# Patient Record
Sex: FEMALE | Race: WHITE | Employment: FULL TIME | URBAN - NONMETROPOLITAN AREA
[De-identification: names, ages, dates, MRNs, and addresses within clinical notes are randomized per-mention and may not be internally consistent; named-entity substitution may affect disease eponyms.]

---

## 2019-12-06 ENCOUNTER — HOSPITAL ENCOUNTER (OUTPATIENT)
Dept: MRI IMAGING | Age: 39
Discharge: HOME | End: 2019-12-06
Payer: COMMERCIAL

## 2019-12-06 DIAGNOSIS — D05.02 LOBULAR CARCINOMA IN SITU (LCIS) OF LEFT BREAST: ICD-10-CM

## 2019-12-20 ENCOUNTER — HOSPITAL ENCOUNTER (OUTPATIENT)
Dept: MRI IMAGING | Age: 39
Discharge: HOME | End: 2019-12-20
Payer: COMMERCIAL

## 2019-12-20 DIAGNOSIS — D05.02 LOBULAR CARCINOMA IN SITU (LCIS) OF LEFT BREAST: ICD-10-CM

## 2019-12-20 PROCEDURE — 77049 MRI BREAST C-+ W/CAD BI: CPT

## 2019-12-20 PROCEDURE — A9577 INJ MULTIHANCE: HCPCS | Performed by: SURGERY

## 2019-12-20 PROCEDURE — 6360000004 HC RX CONTRAST MEDICATION: Performed by: SURGERY

## 2019-12-20 RX ADMIN — GADOBENATE DIMEGLUMINE 19 ML: 529 INJECTION, SOLUTION INTRAVENOUS at 16:01

## 2020-01-22 ENCOUNTER — TELEPHONE (OUTPATIENT)
Dept: SURGERY | Age: 40
End: 2020-01-22

## 2020-01-22 NOTE — TELEPHONE ENCOUNTER
Patient called khoa and asked for a return call. Please call pt back as soon as you can.   Thank you

## 2020-01-31 NOTE — PROGRESS NOTES
HISTORY OF PRESENT ILLNESS:    Ms. Raul Mayorga is a 44 y.o. white  female who presents with abnormal pathology following bilateral breast reduction. Her surgery was done in [facility] and on pathologic evaluation, she was found to have atypical lobular hyperplasia and lobular carcinoma in situ on the left. She has a family history of breast cancer in her paternal aunt    She has had myRisk testing and is negative    She is premenopausal.      MRI-12/20/2019  FINDINGS:   There is moderate background parenchymal enhancement, which appears   symmetric. There are scattered areas of fibroglandular density. Left breast:   There is extensive fat necrosis throughout the left breast. There is   associated rim enhancement. This somewhat limits evaluation for   underlying areas of abnormal enhancement. No additional areas of   abnormal enhancement are identified. Right breast:   Areas of fat necrosis are noted throughout the right breast with   associated enhancement peripherally. No additional areas of abnormal   enhancement are identified. Lymph nodes:   No suspicious axillary or internal mammary lymphadenopathy is   identified.       Impression   1. Extensive areas of fat necrosis throughout the breasts bilaterally   with associated rim enhancement. This is compatible with recent breast   reduction surgery. 2. No additional areas of enhancement are identified within either   breast, though evaluation is limited by the degree of enhancement   related to fat necrosis. 3. No suspicious lymphadenopathy. 4. BI-RADS Category 2-benign. Signed by Dr Rolanda Lafleur on 12/23/2019 7:54 AM         BREAST EXAM:    Florence Traore  has unremarkable fibrocystic changes throughout both breasts. There are no dominant masses, no skin or nipple changes and no axillary adenopathy. I see nothing suspicious on physical examination. She has bilateral reduction mammoplasty scars and appropriate nodularity associated with this.   She is healed sufficiently to consider proceeding with bilateral mastectomies and reconstruction. She has a significantly widened scars. IMPRESSION: High risk with LCIS. PLAN: Prophylactic bilateral mastectomy with bilateral reconstruction     I will discuss this with Dr. Enzo Rodriguez and we will determine the optimal timing of her management. DISCUSSION:  We discussed  the fibrocystic disease and its relationship to breast cancer  , the pathophysiology of breast cancer, the pathophysiology of fibrocystic disease, the importance of routine mammograms, the technique of good breast self-examination and encouraged her, the effect of caffeine on her breasts and the risks and benefits of hormone replacement therapy    I have seen, examined and reviewed this patient medication list, appropriate labs and imaging studies. I reviewed relevant medical records and others physicians notes. I discussed the plans of care with the patient. I answered all the questions to the patients satisfaction. I, Dr Emily Fernandez, personally performed the services described in this documentation as scribed by Dominique Schulz MA in my presence and is both accurate and complete. (Please note that portions of this note were completed with a voice recognition program. Efforts were made to edit the dictations but occasionally words are mis-transcribed.)  Over 50% of the total visit time of 60 minutes in face to face encounter with the patient, out of which more than 50% of the time was spent in counseling patient or family and coordination of care. Counseling included but was not limited to time spent reviewing labs, imaging studies/ treatment plan and answering questions.

## 2020-02-03 ENCOUNTER — OFFICE VISIT (OUTPATIENT)
Dept: SURGERY | Age: 40
End: 2020-02-03
Payer: COMMERCIAL

## 2020-02-03 VITALS
BODY MASS INDEX: 41.46 KG/M2 | WEIGHT: 234 LBS | HEART RATE: 80 BPM | DIASTOLIC BLOOD PRESSURE: 80 MMHG | HEIGHT: 63 IN | SYSTOLIC BLOOD PRESSURE: 110 MMHG

## 2020-02-03 PROCEDURE — 99205 OFFICE O/P NEW HI 60 MIN: CPT | Performed by: SURGERY

## 2020-02-03 RX ORDER — RANITIDINE 150 MG/1
TABLET ORAL
COMMUNITY

## 2020-02-03 RX ORDER — KETOROLAC TROMETHAMINE 15.75 MG/1
SPRAY, METERED NASAL
COMMUNITY

## 2020-02-03 RX ORDER — DESVENLAFAXINE 50 MG/1
TABLET, EXTENDED RELEASE ORAL
COMMUNITY

## 2020-02-03 RX ORDER — IBUPROFEN 800 MG/1
TABLET ORAL
COMMUNITY

## 2020-02-03 NOTE — Clinical Note
Make sure I  talk [facility]

## 2020-02-04 PROBLEM — N60.99 ATYPICAL LOBULAR HYPERPLASIA OF BREAST: Status: ACTIVE | Noted: 2020-02-04

## 2020-02-04 PROBLEM — D05.00 BREAST NEOPLASM, TIS (LCIS): Status: ACTIVE | Noted: 2020-02-04

## 2020-02-20 ENCOUNTER — TELEPHONE (OUTPATIENT)
Dept: SURGERY | Age: 40
End: 2020-02-20

## 2020-02-26 NOTE — TELEPHONE ENCOUNTER
I spoke with patient to let her know I would get with Dr. Beltran Matters to ask her question and get back with her

## 2020-03-06 ENCOUNTER — TELEPHONE (OUTPATIENT)
Dept: SURGERY | Age: 40
End: 2020-03-06

## 2020-10-15 ENCOUNTER — TELEPHONE (OUTPATIENT)
Dept: SURGERY | Age: 40
End: 2020-10-15

## 2020-10-15 NOTE — TELEPHONE ENCOUNTER
Called and spoke with patient. Dr. Ernesto Zhu wanted me to touch base with her. She was seen in February of 2020 and discussed surgical plans. COVID happened and we had not heard back from her. She states that her partner is currently deployed overseas for 4 months and she is going to wait until after the first of the year to come back in and hopefully get surgery set up in the spring. I told her that would be fine and I would let Dr. Ernesto Zhu know.

## 2020-12-22 ENCOUNTER — TRANSCRIBE ORDERS (OUTPATIENT)
Dept: ADMINISTRATIVE | Facility: HOSPITAL | Age: 40
End: 2020-12-22

## 2020-12-22 DIAGNOSIS — D05.02 LOBULAR CARCINOMA IN SITU OF LEFT BREAST: Primary | ICD-10-CM

## 2021-01-07 ENCOUNTER — APPOINTMENT (OUTPATIENT)
Dept: MRI IMAGING | Facility: HOSPITAL | Age: 41
End: 2021-01-07

## 2021-01-08 ENCOUNTER — APPOINTMENT (OUTPATIENT)
Dept: MRI IMAGING | Facility: HOSPITAL | Age: 41
End: 2021-01-08

## 2021-01-29 ENCOUNTER — HOSPITAL ENCOUNTER (OUTPATIENT)
Dept: MRI IMAGING | Age: 41
Discharge: HOME | End: 2021-01-29
Payer: COMMERCIAL

## 2021-01-29 DIAGNOSIS — D05.02 LOBULAR CARCINOMA IN SITU OF LEFT BREAST: ICD-10-CM

## 2021-01-29 PROCEDURE — 6360000004 HC RX CONTRAST MEDICATION: Performed by: PSYCHIATRY & NEUROLOGY

## 2021-01-29 PROCEDURE — 77049 MRI BREAST C-+ W/CAD BI: CPT

## 2021-01-29 PROCEDURE — A9577 INJ MULTIHANCE: HCPCS | Performed by: PSYCHIATRY & NEUROLOGY

## 2021-01-29 RX ADMIN — GADOBENATE DIMEGLUMINE 20 ML: 529 INJECTION, SOLUTION INTRAVENOUS at 15:15

## 2022-12-19 ENCOUNTER — OFFICE VISIT (OUTPATIENT)
Dept: SURGERY | Facility: CLINIC | Age: 42
End: 2022-12-19

## 2022-12-19 VITALS
BODY MASS INDEX: 31.01 KG/M2 | HEIGHT: 63 IN | HEART RATE: 86 BPM | OXYGEN SATURATION: 95 % | DIASTOLIC BLOOD PRESSURE: 95 MMHG | SYSTOLIC BLOOD PRESSURE: 135 MMHG | WEIGHT: 175 LBS

## 2022-12-19 DIAGNOSIS — E66.1 CLASS 1 DRUG-INDUCED OBESITY WITHOUT SERIOUS COMORBIDITY WITH BODY MASS INDEX (BMI) OF 31.0 TO 31.9 IN ADULT: ICD-10-CM

## 2022-12-19 DIAGNOSIS — Z86.000 HISTORY OF LOBULAR CARCINOMA IN SITU (LCIS) OF BREAST: ICD-10-CM

## 2022-12-19 DIAGNOSIS — Z98.890 S/P REDUCTION MAMMOPLASTY: ICD-10-CM

## 2022-12-19 DIAGNOSIS — Z80.3 FAMILY HISTORY OF BREAST CANCER: Primary | ICD-10-CM

## 2022-12-19 PROCEDURE — 99204 OFFICE O/P NEW MOD 45 MIN: CPT | Performed by: STUDENT IN AN ORGANIZED HEALTH CARE EDUCATION/TRAINING PROGRAM

## 2022-12-19 RX ORDER — MELOXICAM 15 MG/1
TABLET ORAL
COMMUNITY
Start: 2022-11-01

## 2022-12-19 RX ORDER — DIAZEPAM 2 MG/1
2 TABLET ORAL ONCE
Qty: 1 TABLET | Refills: 0 | Status: SHIPPED | OUTPATIENT
Start: 2022-12-19 | End: 2022-12-19

## 2022-12-19 NOTE — PATIENT INSTRUCTIONS

## 2022-12-19 NOTE — PROGRESS NOTES
"Office New Patient History and Physical:     Referring Provider: Sheri Smith AP*    Chief Complaint   Patient presents with   • Skin Lesion        Subjective .     History of present illness:  Rachel Manley is a 42 y.o. female who presents with an elevated risk of breast cancer based on her Tyrer-Cuzick and family history. S/p bilateral reduction with Atypical hyperplasia on pathology.     Breast History information:   Prior abnormal mammograms: 2021   Prior breast biopsies: 2021, left, fat necrosis   Palpable breast masses: cysts at 3:00 and 6:00 left breast, unchanged x years   Nipple discharge: none   Age at first menses: 12  Age at menopause: n/a   Number of biological children: 2  Age at first birth: 30   Years on birth control: 10 years on pills, IUD x 10 years   Years on HRT: none   Family history of breast cancer: Paternal aunt with two different primary breast cancers (age 50, 58)   Smoking History: none   Alcohol use: none   BMI: 31     Patient reports she has a bilateral MRI in [place] in  - no abnormalities. She is not on blood thinners.     History  History reviewed. No pertinent past medical history.,   Past Surgical History:   Procedure Laterality Date   • APPENDECTOMY     • BILATERAL BREAST REDUCTION     •  SECTION     •  SECTION     , History reviewed. No pertinent family history.,   Social History     Tobacco Use   • Smoking status: Never   • Smokeless tobacco: Never   Vaping Use   • Vaping Use: Never used   Substance Use Topics   • Alcohol use: Defer   • Drug use: Defer   , (Not in a hospital admission)   and Allergies:  Sulfa antibiotics    Current Outpatient Medications:   •  meloxicam (MOBIC) 15 MG tablet, , Disp: , Rfl:     Objective     Vital Signs   /95 (BP Location: Left arm, Patient Position: Sitting, Cuff Size: Adult)   Pulse 86   Ht 160 cm (63\")   Wt 79.4 kg (175 lb)   SpO2 95%   BMI 31.00 kg/m²      Physical " Exam:  General appearance - alert, well appearing, and in no distress  Mental status - alert, oriented to person, place, and time  Eyes - pupils equal and reactive, extraocular eye movements intact  Chest - no tachypnea, retractions or cyanosis  Heart - normal rate and regular rhythm  Neurological - alert, oriented, normal speech, no focal findings or movement disorder noted  Breast Exam: Bilateral breasts with reduction scars. Right reduction scars well healed.  Left breast with multiple cysts along reduction incisions and in left outer lower quadrant however incisions are healed. Bilateral nipples everted. Patient examined in the supine position with the ipsilateral arm above the head. No palpable masses bilaterally. No nipple discharge bilaterally. No palpable axillary nor supraclavicular adenopathy bilaterally.     Results Review:    The following data was reviewed by: Delma Pisano MD on 12/19/2022:  REFERRAL/PRE-AUTH MRN - SCAN - REF FROM [facility] (11/01/2022)  Genetic Testing: [place] Panel with 2 variants of uncertain significance: CHEK2 and APC.   Tyrer-Cuzick if using diagnosis of LCIS: 59.3%   Tyrer-Cuzick if using diagnosis of atypical hyperplasia: 36%     GENERAL SURGERY - SCAN - PATH, IMAGING (12/19/2022)    7/12/22 bilateral mammogram: there are bilateral changes of fat necrosis. An irregular area in the LEFT upper inner quadrant appears to have decrease din size when compared with the prior exam of 7/22/21 and exhibits an increased number of benign-appearing calcifications consistent with fat necrosis.     1/31/21: Bilateral breast MRI. Within the left breast there are 3 regions of fat necrosis with prominent rim enhancement. No abnormal enhancement within the contralateral right breast. No suspicious lymphadenopathy. BIRADS 2 benign.     Pathology [place] 8/25/19:   (1) Right Breast Reduction: benign mammary parenchyma with papillary apocrine change, negative for atypcial hyperplasia and  malignancy.   (2) Left breast reduction: multifocal atypical lobular hyperplasia with ductal involvement, focally reaching criteria for classic lobular carcinoma in situ.     Assessment & Plan       Diagnoses and all orders for this visit:    1. Family history of breast cancer (Primary)  -     MRI Breast Bilateral With & Without Contrast; Future  -     Ambulatory Referral to Plastic Surgery  -     diazePAM (Valium) 2 MG tablet; Take 1 tablet by mouth 1 (One) Time for 1 dose. Take before the MRI for anxiety  Dispense: 1 tablet; Refill: 0    2. History of lobular carcinoma in situ (LCIS) of breast  -     MRI Breast Bilateral With & Without Contrast; Future  -     Ambulatory Referral to Plastic Surgery  -     diazePAM (Valium) 2 MG tablet; Take 1 tablet by mouth 1 (One) Time for 1 dose. Take before the MRI for anxiety  Dispense: 1 tablet; Refill: 0    3. S/P reduction mammoplasty  -     MRI Breast Bilateral With & Without Contrast; Future  -     Ambulatory Referral to Plastic Surgery    4. Class 1 drug-induced obesity without serious comorbidity with body mass index (BMI) of 31.0 to 31.9 in adult         Rachel Manley is a 42 y.o. female with a family history of breast cancer as well as a personal history of ADH vs. LCIS (some disagreement over pathology on the [place] report). I have personally reviewed her pathology report, her imaging and her notes above. Her TC score is significantly elevated at 36% if ADH and 59% if LCIS is considered the diagnosis. She has significant cysts along her left reduction incisions which she reports are chronic. She has been doing high risk screening with MRI and mammogram yearly for imaging every 6 months. She has been considering bilateral prophylactic mastectomies with reconstruction. She has significant anxiety about her elevated breast cancer risk and her family history. She would like to speak to a plastic surgery to discuss her options of nipple sparing vs. Skin sparing and  implant options. I have referred her to Dr. Gonzalez. She is due for her screening MRI in Januarys he will follow up with me in January after her appointment with Dr. Gonzalez and her MRI to discuss proceeding with bilateral prophylactic mastectomies.    I personally called and spoke with Dr. Gonzalez about the patient's history and wishes and he agrees she would be a good candidate. He will see her in office. I have reviewed the records above.     She is on Tamoxifen for risk reduction and she should continue this.     BMI is >= 30 and <35. (Class 1 Obesity). The following options were offered after discussion;: weight loss educational material (shared in after visit summary)      Delma Pisano MD  12/21/22  11:27 CST

## 2023-01-06 ENCOUNTER — HOSPITAL ENCOUNTER (OUTPATIENT)
Dept: MRI IMAGING | Facility: HOSPITAL | Age: 43
Discharge: HOME | End: 2023-01-06
Admitting: STUDENT IN AN ORGANIZED HEALTH CARE EDUCATION/TRAINING PROGRAM
Payer: COMMERCIAL

## 2023-01-06 DIAGNOSIS — Z98.890 S/P REDUCTION MAMMOPLASTY: ICD-10-CM

## 2023-01-06 DIAGNOSIS — Z80.3 FAMILY HISTORY OF BREAST CANCER: ICD-10-CM

## 2023-01-06 DIAGNOSIS — Z86.000 HISTORY OF LOBULAR CARCINOMA IN SITU (LCIS) OF BREAST: ICD-10-CM

## 2023-01-06 PROCEDURE — A9577 INJ MULTIHANCE: HCPCS | Performed by: STUDENT IN AN ORGANIZED HEALTH CARE EDUCATION/TRAINING PROGRAM

## 2023-01-06 PROCEDURE — 77049 MRI BREAST C-+ W/CAD BI: CPT

## 2023-01-06 PROCEDURE — 0 GADOBENATE DIMEGLUMINE 529 MG/ML SOLUTION: Performed by: STUDENT IN AN ORGANIZED HEALTH CARE EDUCATION/TRAINING PROGRAM

## 2023-01-06 RX ADMIN — GADOBENATE DIMEGLUMINE 15 ML: 529 INJECTION, SOLUTION INTRAVENOUS at 16:24

## 2023-01-24 NOTE — PROGRESS NOTES
"Office Established Patient Note:     Referring Provider: No ref. provider found    Chief Complaint   Patient presents with   • Follow-up     Mrs. Manley is here for a f/u for hx of family breast cancer.        Subjective .     History of present illness:  Rachel Manley is a 42 y.o. female here to follow up MRI results and discuss prophylactic bilateral mastectomies. She tolerated her MRI well. She denies any change to her medical or surgical history since her last appointment. She denies any changes to her breasts since her last appointment.     History  History reviewed. No pertinent past medical history.,   Past Surgical History:   Procedure Laterality Date   • APPENDECTOMY     • BILATERAL BREAST REDUCTION     •  SECTION     •  SECTION     , History reviewed. No pertinent family history.,   Social History     Tobacco Use   • Smoking status: Never   • Smokeless tobacco: Never   Vaping Use   • Vaping Use: Never used   Substance Use Topics   • Alcohol use: Defer   • Drug use: Defer   , (Not in a hospital admission)   and Allergies:  Sulfa antibiotics    Current Outpatient Medications:   •  cetirizine (zyrTEC) 10 MG tablet, , Disp: , Rfl:   •  cimetidine (TAGAMET) 400 MG tablet, , Disp: , Rfl:   •  meloxicam (MOBIC) 15 MG tablet, , Disp: , Rfl:   •  Mounjaro 5 MG/0.5ML solution pen-injector, , Disp: , Rfl:   •  tamoxifen (NOLVADEX) 20 MG chemo tablet, , Disp: , Rfl:     Objective     Vital Signs   /82 (BP Location: Left arm, Patient Position: Sitting, Cuff Size: Adult)   Pulse 90   Ht 160 cm (62.99\")   Wt 77.1 kg (170 lb)   SpO2 99%   BMI 30.12 kg/m²      Physical Exam:  General appearance - alert, well appearing, and in no distress  Mental status - alert, oriented to person, place, and time  Eyes - pupils equal and reactive, extraocular eye movements intact  Neck - supple, no significant adenopathy  Chest - no tachypnea, retractions or cyanosis  Heart - normal rate and regular " rhythm  Abdomen - soft, nontender, nondistended, no masses or organomegaly  Neurological - alert, oriented, normal speech, no focal findings or movement disorder noted  Musculoskeletal - no joint tenderness, deformity or swelling    Results Review:    The following data was reviewed by: Delma Pisano MD on 01/25/2023:    MRI Breast Bilateral With & Without Contrast (01/06/2023 16:23)  1. There is extensive fat necrosis associated with the reduction  mammoplasty in this patient with a history of atypical hyperplasia  versus lobular carcinoma in situ. I strongly recommended this patient  obtain annual MRI breast imaging at the same facility so images may be  processed and compared more appropriately on postprocessing. Today's  images compared to the [facility] 01/29/2021 study.  2. There are 2 enhancing lesions of the lower outer right breast likely  near 8 and 7:00 (reconstructed nipple positioned superiorly) within the  mid depth for which second look ultrasound is recommended given the  interval change in the appearance of the regional tissue and fat  necrosis compared to 2021.  3. Similar to decreasing enhancement of the fat necrosis within the  superior left breast. 1.3 cm sebaceous cyst suspected of the lower outer  left breast.  4. BI-RADS 0-incomplete. Second Look right breast ultrasound  Recommended.  US Breast Right Limited (01/25/2023 12:31)  1. There is an ill-defined isoechoic to slightly hypoechoic area at 8:00  in the right breast and 2 cm deep to the transducer that is considered  suspicious and corresponds to the location of the abnormality seen on  MRI at the 8:00 position. Ultrasound-guided biopsy of this abnormality  is recommended. BI-RADS 4.  2. A small benign-appearing lymph node is also seen at the 8:00 position  of the right breast.  3. No definite ultrasound abnormality is seen at 7:00 in the right  breast where an MRI abnormality was described. If the biopsy of the  lesion at 8:00 proves  to represent atypia or breast cancer, more  aggressive evaluation may be needed for this lesion, such as MRI guided  biopsy.      Assessment & Plan       Diagnoses and all orders for this visit:    1. Abnormal MRI, breast (Primary)  -     US Breast Right Limited; Future    2. Family history of breast cancer    3. History of lobular carcinoma in situ (LCIS) of breast    4. S/P reduction mammoplasty    5. 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 LCIS. She is s/p bilateral breast reduction with impaired healing. She does desire prophylactic bilateral mastectomies with reconstruction by Dr. Gonzalez. She has an appointment with him today to discuss her reconstruction options. She is hoping to schedule surgery around her work schedule, hopefully in early July. She has an appointment to see me in [place] in June. She did have an abnormality on her MRI for which I have recommended a right breast US. This was done today and showed a concerning lesion, BIRADS 4. She is scheduled for breast biopsy on 2/2/23. I will call her with the results.     The breast lesion on US is a new undiagnosed problem with an uncertain prognosis. I have personally reviewed her MRI, ordered the US and reviewed it, and ordered a breast biopsy. I have personally discussed the patient with Dr. Gonzalez and he agrees that she is a good candidate for reconstruction.     BMI is >= 30 and <35. (Class 1 Obesity). The following options were offered after discussion;: weight loss educational material (shared in after visit summary)      Delma Pisano MD  01/28/23  10:54 CST

## 2023-01-25 ENCOUNTER — OFFICE VISIT (OUTPATIENT)
Dept: SURGERY | Facility: CLINIC | Age: 43
End: 2023-01-25
Payer: COMMERCIAL

## 2023-01-25 ENCOUNTER — HOSPITAL ENCOUNTER (OUTPATIENT)
Dept: ULTRASOUND IMAGING | Facility: HOSPITAL | Age: 43
Discharge: HOME | End: 2023-01-25
Admitting: STUDENT IN AN ORGANIZED HEALTH CARE EDUCATION/TRAINING PROGRAM
Payer: COMMERCIAL

## 2023-01-25 VITALS
WEIGHT: 170 LBS | OXYGEN SATURATION: 99 % | HEART RATE: 90 BPM | HEIGHT: 63 IN | BODY MASS INDEX: 30.12 KG/M2 | SYSTOLIC BLOOD PRESSURE: 118 MMHG | DIASTOLIC BLOOD PRESSURE: 82 MMHG

## 2023-01-25 DIAGNOSIS — Z80.3 FAMILY HISTORY OF BREAST CANCER: ICD-10-CM

## 2023-01-25 DIAGNOSIS — E66.09 CLASS 1 OBESITY DUE TO EXCESS CALORIES WITH BODY MASS INDEX (BMI) OF 30.0 TO 30.9 IN ADULT, UNSPECIFIED WHETHER SERIOUS COMORBIDITY PRESENT: ICD-10-CM

## 2023-01-25 DIAGNOSIS — R92.8 ABNORMAL MRI, BREAST: Primary | ICD-10-CM

## 2023-01-25 DIAGNOSIS — R92.8 ABNORMAL MRI, BREAST: ICD-10-CM

## 2023-01-25 DIAGNOSIS — Z98.890 S/P REDUCTION MAMMOPLASTY: ICD-10-CM

## 2023-01-25 DIAGNOSIS — Z86.000 HISTORY OF LOBULAR CARCINOMA IN SITU (LCIS) OF BREAST: ICD-10-CM

## 2023-01-25 PROCEDURE — 76642 ULTRASOUND BREAST LIMITED: CPT

## 2023-01-25 PROCEDURE — 99214 OFFICE O/P EST MOD 30 MIN: CPT | Performed by: STUDENT IN AN ORGANIZED HEALTH CARE EDUCATION/TRAINING PROGRAM

## 2023-01-25 RX ORDER — CETIRIZINE HYDROCHLORIDE 10 MG/1
TABLET ORAL
COMMUNITY
Start: 2023-01-16

## 2023-01-25 RX ORDER — TIRZEPATIDE 5 MG/.5ML
INJECTION, SOLUTION SUBCUTANEOUS
COMMUNITY
Start: 2023-01-12

## 2023-01-25 RX ORDER — CIMETIDINE 400 MG/1
TABLET, FILM COATED ORAL
COMMUNITY
Start: 2022-11-01

## 2023-01-25 RX ORDER — TAMOXIFEN CITRATE 20 MG/1
TABLET ORAL
COMMUNITY
Start: 2022-12-05

## 2023-01-28 NOTE — PATIENT INSTRUCTIONS

## 2023-02-02 ENCOUNTER — HOSPITAL ENCOUNTER (OUTPATIENT)
Dept: MAMMOGRAPHY | Facility: HOSPITAL | Age: 43
Discharge: HOME | End: 2023-02-02
Payer: COMMERCIAL

## 2023-02-02 ENCOUNTER — HOSPITAL ENCOUNTER (OUTPATIENT)
Dept: ULTRASOUND IMAGING | Facility: HOSPITAL | Age: 43
Discharge: HOME | End: 2023-02-02
Payer: COMMERCIAL

## 2023-02-02 ENCOUNTER — APPOINTMENT (OUTPATIENT)
Dept: OTHER | Facility: HOSPITAL | Age: 43
End: 2023-02-02
Payer: COMMERCIAL

## 2023-02-02 DIAGNOSIS — R92.8 ABNORMAL MRI, BREAST: ICD-10-CM

## 2023-02-02 PROCEDURE — A4648 IMPLANTABLE TISSUE MARKER: HCPCS

## 2023-02-02 PROCEDURE — 88305 TISSUE EXAM BY PATHOLOGIST: CPT | Performed by: STUDENT IN AN ORGANIZED HEALTH CARE EDUCATION/TRAINING PROGRAM

## 2023-02-02 RX ORDER — LIDOCAINE HYDROCHLORIDE AND EPINEPHRINE 10; 10 MG/ML; UG/ML
10 INJECTION, SOLUTION INFILTRATION; PERINEURAL ONCE
Status: DISPENSED | OUTPATIENT
Start: 2023-02-02

## 2023-02-02 RX ORDER — LIDOCAINE HYDROCHLORIDE 10 MG/ML
10 INJECTION, SOLUTION INFILTRATION; PERINEURAL ONCE
Status: DISPENSED | OUTPATIENT
Start: 2023-02-02

## 2023-02-03 LAB
CYTO UR: NORMAL
LAB AP CASE REPORT: NORMAL
Lab: NORMAL
PATH REPORT.FINAL DX SPEC: NORMAL
PATH REPORT.GROSS SPEC: NORMAL

## 2023-06-22 PROBLEM — Z80.3 FAMILY HISTORY OF BREAST CANCER: Status: ACTIVE | Noted: 2023-06-22

## 2023-06-26 PROBLEM — Z86.000 HISTORY OF LOBULAR CARCINOMA IN SITU (LCIS) OF BREAST: Status: ACTIVE | Noted: 2023-06-26

## 2023-07-06 PROBLEM — Z80.3 FAMILY HISTORY OF BREAST CANCER IN FEMALE: Status: ACTIVE | Noted: 2023-07-06

## 2023-09-18 ENCOUNTER — OFFICE VISIT (OUTPATIENT)
Dept: SURGERY | Facility: CLINIC | Age: 43
End: 2023-09-18
Payer: COMMERCIAL

## 2023-09-18 VITALS
SYSTOLIC BLOOD PRESSURE: 129 MMHG | OXYGEN SATURATION: 98 % | HEIGHT: 62 IN | DIASTOLIC BLOOD PRESSURE: 87 MMHG | WEIGHT: 157 LBS | HEART RATE: 81 BPM | BODY MASS INDEX: 28.89 KG/M2

## 2023-09-18 DIAGNOSIS — Z90.13 S/P BILATERAL MASTECTOMY: ICD-10-CM

## 2023-09-18 DIAGNOSIS — Z80.3 FAMILY HISTORY OF BREAST CANCER: Primary | ICD-10-CM

## 2023-09-18 PROCEDURE — 99024 POSTOP FOLLOW-UP VISIT: CPT | Performed by: STUDENT IN AN ORGANIZED HEALTH CARE EDUCATION/TRAINING PROGRAM

## 2023-09-18 RX ORDER — BUSPIRONE HYDROCHLORIDE 7.5 MG/1
TABLET ORAL
COMMUNITY
Start: 2023-06-22

## 2023-09-18 NOTE — PROGRESS NOTES
"Patient: Rachel Manley    YOB: 1980    Date: 09/18/2023    Primary Care Provider: Becca Anderson APRN    Vital Signs:   Vitals:    09/18/23 1103   BP: 129/87   Pulse: 81   SpO2: 98%   Weight: 71.2 kg (157 lb)   Height: 157.5 cm (62\")       She is doing well s/p bilateral mastectomies. She is following closely with Dr. Gonzalez and still has a drain in place on the right. Incisions much improved.     Results Review:   I reviewed the patient's new clinical results.        Assessment / Plan:    Diagnoses and all orders for this visit:    1. Family history of breast cancer (Primary)    2. S/P bilateral mastectomy        Follow up in 6 months. Continue care per Dr. Gonzalez.     Electronically signed by Delma Pisano MD  09/18/23  20:07 CDT                  "

## 2023-09-19 NOTE — PATIENT INSTRUCTIONS

## 2023-12-21 ENCOUNTER — PRE-ADMISSION TESTING (OUTPATIENT)
Dept: PREADMISSION TESTING | Facility: HOSPITAL | Age: 43
End: 2023-12-21
Payer: COMMERCIAL

## 2023-12-21 VITALS
DIASTOLIC BLOOD PRESSURE: 77 MMHG | HEIGHT: 62 IN | RESPIRATION RATE: 18 BRPM | SYSTOLIC BLOOD PRESSURE: 129 MMHG | OXYGEN SATURATION: 100 % | WEIGHT: 164.9 LBS | BODY MASS INDEX: 30.35 KG/M2 | HEART RATE: 80 BPM

## 2023-12-21 LAB
ANION GAP SERPL CALCULATED.3IONS-SCNC: 8 MMOL/L (ref 5–15)
BUN SERPL-MCNC: 16 MG/DL (ref 6–20)
BUN/CREAT SERPL: 24.2 (ref 7–25)
CALCIUM SPEC-SCNC: 8.9 MG/DL (ref 8.6–10.5)
CHLORIDE SERPL-SCNC: 107 MMOL/L (ref 98–107)
CO2 SERPL-SCNC: 26 MMOL/L (ref 22–29)
CREAT SERPL-MCNC: 0.66 MG/DL (ref 0.57–1)
DEPRECATED RDW RBC AUTO: 48.5 FL (ref 37–54)
EGFRCR SERPLBLD CKD-EPI 2021: 111.8 ML/MIN/1.73
ERYTHROCYTE [DISTWIDTH] IN BLOOD BY AUTOMATED COUNT: 13.7 % (ref 12.3–15.4)
GLUCOSE SERPL-MCNC: 80 MG/DL (ref 65–99)
HCT VFR BLD AUTO: 42 % (ref 34–46.6)
HGB BLD-MCNC: 12.9 G/DL (ref 12–15.9)
MCH RBC QN AUTO: 29.3 PG (ref 26.6–33)
MCHC RBC AUTO-ENTMCNC: 30.7 G/DL (ref 31.5–35.7)
MCV RBC AUTO: 95.5 FL (ref 79–97)
PLATELET # BLD AUTO: 246 10*3/MM3 (ref 140–450)
PMV BLD AUTO: 9.8 FL (ref 6–12)
POTASSIUM SERPL-SCNC: 4.5 MMOL/L (ref 3.5–5.2)
RBC # BLD AUTO: 4.4 10*6/MM3 (ref 3.77–5.28)
SODIUM SERPL-SCNC: 141 MMOL/L (ref 136–145)
WBC NRBC COR # BLD AUTO: 6.23 10*3/MM3 (ref 3.4–10.8)

## 2023-12-21 PROCEDURE — 85027 COMPLETE CBC AUTOMATED: CPT

## 2023-12-21 PROCEDURE — 80048 BASIC METABOLIC PNL TOTAL CA: CPT

## 2023-12-21 PROCEDURE — 36415 COLL VENOUS BLD VENIPUNCTURE: CPT

## 2023-12-21 NOTE — DISCHARGE INSTRUCTIONS
Before you come to the hospital        Arrival time: AS DIRECTED BY OFFICE     YOU MAY TAKE THE FOLLOWING MEDICATION(S) THE MORNING OF SURGERY WITH A SIP OF WATER: AS MD HAS DIRECTED           ALL OTHER HOME MEDICATION CHECK WITH YOUR PHYSICIAN (especially if   you are taking diabetes medicines or blood thinners)    Do not take any Erectile Dysfunction medications (EX: CIALIS, VIAGRA) 24 hours prior to surgery.      If you were given and instructed to use a germ- killing soap, use as directed the night before surgery and again the morning of surgery or as directed by your surgeon. (Use one-half of the bottle with each shower.)   See attached information for How to Use Chlorhexidine for Bathing if applicable.            Eating and drinking restrictions prior to scheduled arrival time    2 Hours before arrival time STOP   Drinking Clear liquids (water, black coffee-NO CREAM,  apple juice-no pulp)    Clear Liquids    Water and flavored water                                                                      Clear Fruit juices, such as cranberry juice and apple juice.  Black coffee (NO cream of any kind, including powdered).  Plain tea  Clear bouillon or broth.  Flavored gelatin.  Soda.  Gatorade or Powerade.    8 Hours before arrival time STOP   All food, full liquids, and dairy products  Full liquid examples  Juices that have pulp.  Frozen ice pops that contain fruit pieces.  Coffee with creamer  Milk.  Yogurt.    (It is extremely important that you follow these guidelines to prevent delay or cancelation of your procedure)                       MANAGING PAIN AFTER SURGERY    We know you are probably wondering what your pain will be like after surgery.  Following surgery it is unrealistic to expect you will not have pain.   Pain is how our bodies let us know that something is wrong or cautions us to be careful.  That said, our goal is to make your pain tolerable.    Methods we may use to treat your pain include (oral  or IV medications, PCAs, epidurals, nerve blocks, etc.)   While some procedures require IV pain medications for a short time after surgery, transitioning to pain medications by mouth allows for better management of pain.   Your nurse will encourage you to take oral pain medications whenever possible.  IV medications work almost immediately, but only last a short while.  Taking medications by mouth allows for a more constant level of medication in your blood stream for a longer period of time.      Once your pain is out of control it is harder to get back under control.  It is important you are aware when your next dose of pain medication is due.  If you are admitted, your nurse may write the time of your next dose on the white board in your room to help you remember.      We are interested in your pain and encourage you to inform us about aggravating factors during your visit.   Many times a simple repositioning every few hours can make a big difference.    If your physician says it is okay, do not let your pain prevent you from getting out of bed. Be sure to call your nurse for assistance prior to getting up so you do not fall.      Before surgery, please decide your tolerable pain goal.  These faces help describe the pain ratings we use on a 0-10 scale.   Be prepared to tell us your goal and whether or not you take pain or anxiety medications at home.          Preparing for Surgery  Preparing for surgery is an important part of your care. It can make things go more smoothly and help you avoid complications. The steps leading up to surgery may vary among hospitals. Follow all instructions given to you by your health care providers. Ask questions if you do not understand something. Talk about any concerns that you have.  Here are some questions to consider asking before your surgery:  If my surgery is not an emergency (is elective), when would be the best time to have the surgery?  What arrangements do I need to  make for work, home, or school?  What will my recovery be like? How long will it be before I can return to normal activities?  Will I need to prepare my home? Will I need to arrange care for me or my children?  Should I expect to have pain after surgery? What are my pain management options? Are there nonmedical options that I can try for pain?  Tell a health care provider about:  Any allergies you have.  All medicines you are taking, including vitamins, herbs, eye drops, creams, and over-the-counter medicines.  Any problems you or family members have had with anesthetic medicines.  Any blood disorders you have.  Any surgeries you have had.  Any medical conditions you have.  Whether you are pregnant or may be pregnant.  What are the risks?  The risks and complications of surgery depend on the specific procedure that you have. Discuss all the risks with your health care providers before your surgery. Ask about common surgical complications, which may include:  Infection.  Bleeding or a need for blood replacement (transfusion).  Allergic reactions to medicines.  Damage to surrounding nerves, tissues, or structures.  A blood clot.  Scarring.  Failure of the surgery to correct the problem.  Follow these instructions before the procedure:  Several days or weeks before your procedure  You may have a physical exam by your primary health care provider to make sure it is safe for you to have surgery.  You may have testing. This may include a chest X-ray, blood and urine tests, electrocardiogram (ECG), or other testing.  Ask your health care provider about:  Changing or stopping your regular medicines. This is especially important if you are taking diabetes medicines or blood thinners.  Taking medicines such as aspirin and ibuprofen. These medicines can thin your blood. Do not take these medicines unless your health care provider tells you to take them.  Taking over-the-counter medicines, vitamins, herbs, and supplements.  Do  not use any products that contain nicotine or tobacco, such as cigarettes and e-cigarettes. If you need help quitting, ask your health care provider.  Avoid alcohol.  Ask your health care provider if there are exercises you can do to prepare for surgery.  Eat a healthy diet.   Plan to have someone 18 years of age or older to take you home from the hospital. We will need to verify your ride on the morning of surgery if you are being discharged home on the same day. Tell your ride to be expecting a call from the hospital prior to your procedure.   Plan to have a responsible adult care for you for at least 24 hours after you leave the hospital or clinic. This is important.  The day before your procedure  You may be given antibiotic medicine to take by mouth to help prevent infection. Take it as told by your health care provider.  You may be asked to shower with a germ-killing soap.  Follow instructions from your health care provider about eating and drinking restrictions. This includes gum, mints and hard candy.  Pack comfortable clothes according to your procedure.   The day of your procedure  You may need to take another shower with a germ-killing soap before you leave home in the morning.  With a small sip of water, take only the medicines that you are told to take.  Remove all jewelry including rings.   Leave anything you consider valuable at home except hearing aids if needed.  You do not need to bring your home medications into the hospital.   Do not wear any makeup, nail polish, powder, deodorant, lotion, hair accessories, or anything on your skin or body except your clothes.  If you will be staying in the hospital, bring a case to hold your glasses, contacts, or dentures. You may also want to bring your robe and non-skid footwear.       (Do not use denture adhesives since you will be asked to remove them during  surgery).   If you wear oxygen at home, bring it with you the day of surgery.  If instructed by your  health care provider, bring your sleep apnea device with you on the day of your surgery (if this applies to you).  You may want to leave your suitcase and sleep apnea device in the car until after surgery.   Arrive at the hospital as scheduled.  Bring a friend or family member with you who can help to answer questions and be present while you meet with your health care provider.  At the hospital  When you arrive at the hospital:  Go to registration located at the main entrance of the hospital. You will be registered and given a beeper and a sticker sheet. Take the stickers to the Outpatient nurses desk and place in the black tray. This is to notify staff that you have arrived. Then return to the lobby to wait.   When your beeper lights up and vibrates proceed through the double doors, under the stairs, and a member of the Outpatient Surgery staff will escort you to your preoperative room.  You may have to wear compression sleeves. These help to prevent blood clots and reduce swelling in your legs.  An IV may be inserted into one of your veins.              In the operating room, you may be given one or more of the following:        A medicine to help you relax (sedative).        A medicine to numb the area (local anesthetic).        A medicine to make you fall asleep (general anesthetic).        A medicine that is injected into an area of your body to numb everything below the                      injection site (regional anesthetic).  You may be given an antibiotic through your IV to help prevent infection.  Your surgical site will be marked or identified.    Contact a health care provider if you:  Develop a fever of more than 100.4°F (38°C) or other feelings of illness during the 48 hours before your surgery.  Have symptoms that get worse.  Have questions or concerns about your surgery.  Summary  Preparing for surgery can make the procedure go more smoothly and lower your risk of complications.  Before surgery,  make a list of questions and concerns to discuss with your surgeon. Ask about the risks and possible complications.  In the days or weeks before your surgery, follow all instructions from your health care provider. You may need to stop smoking, avoid alcohol, follow eating restrictions, and change or stop your regular medicines.  Contact your surgeon if you develop a fever or other signs of illness during the few days before your surgery.  This information is not intended to replace advice given to you by your health care provider. Make sure you discuss any questions you have with your health care provider.  Document Revised: 12/21/2018 Document Reviewed: 10/23/2018  [employer] Patient Education © 2021 [employer]         How to Use Chlorhexidine Before Surgery  Chlorhexidine gluconate (CHG) is a germ-killing (antiseptic) solution that is used to clean the skin. It can get rid of the bacteria that normally live on the skin and can keep them away for about 24 hours. To clean your skin with CHG, you may be given:  A CHG solution to use in the shower or as part of a sponge bath.  A prepackaged cloth that contains CHG.  Cleaning your skin with CHG may help lower the risk for infection:  While you are staying in the intensive care unit of the hospital.  If you have a vascular access, such as a central line, to provide short-term or long-term access to your veins.  If you have a catheter to drain urine from your bladder.  If you are on a ventilator. A ventilator is a machine that helps you breathe by moving air in and out of your lungs.  After surgery.  What are the risks?  Risks of using CHG include:  A skin reaction.  Hearing loss, if CHG gets in your ears and you have a perforated eardrum.  Eye injury, if CHG gets in your eyes and is not rinsed out.  The CHG product catching fire.  Make sure that you avoid smoking and flames after applying CHG to your skin.  Do not use CHG:  If you have a chlorhexidine allergy or have  previously reacted to chlorhexidine.  On babies younger than 2 months of age.  How to use CHG solution  Use CHG only as told by your health care provider, and follow the instructions on the label.  Use the full amount of CHG as directed. Usually, this is one bottle.  During a shower    Follow these steps when using CHG solution during a shower (unless your health care provider gives you different instructions):  Start the shower.  Use your normal soap and shampoo to wash your face and hair.  Turn off the shower or move out of the shower stream.  Pour the CHG onto a clean washcloth. Do not use any type of brush or rough-edged sponge.  Starting at your neck, lather your body down to your toes. Make sure you follow these instructions:  If you will be having surgery, pay special attention to the part of your body where you will be having surgery. Scrub this area for at least 1 minute.  Do not use CHG on your head or face. If the solution gets into your ears or eyes, rinse them well with water.  Avoid your genital area.  Avoid any areas of skin that have broken skin, cuts, or scrapes.  Scrub your back and under your arms. Make sure to wash skin folds.  Let the lather sit on your skin for 1-2 minutes or as long as told by your health care provider.  Thoroughly rinse your entire body in the shower. Make sure that all body creases and crevices are rinsed well.  Dry off with a clean towel. Do not put any substances on your body afterward--such as powder, lotion, or perfume--unless you are told to do so by your health care provider. Only use lotions that are recommended by the .  Put on clean clothes or pajamas.  If it is the night before your surgery, sleep in clean sheets.     During a sponge bath  Follow these steps when using CHG solution during a sponge bath (unless your health care provider gives you different instructions):  Use your normal soap and shampoo to wash your face and hair.  Pour the CHG onto a  clean washcloth.  Starting at your neck, lather your body down to your toes. Make sure you follow these instructions:  If you will be having surgery, pay special attention to the part of your body where you will be having surgery. Scrub this area for at least 1 minute.  Do not use CHG on your head or face. If the solution gets into your ears or eyes, rinse them well with water.  Avoid your genital area.  Avoid any areas of skin that have broken skin, cuts, or scrapes.  Scrub your back and under your arms. Make sure to wash skin folds.  Let the lather sit on your skin for 1-2 minutes or as long as told by your health care provider.  Using a different clean, wet washcloth, thoroughly rinse your entire body. Make sure that all body creases and crevices are rinsed well.  Dry off with a clean towel. Do not put any substances on your body afterward--such as powder, lotion, or perfume--unless you are told to do so by your health care provider. Only use lotions that are recommended by the .  Put on clean clothes or pajamas.  If it is the night before your surgery, sleep in clean sheets.  How to use CHG prepackaged cloths  Only use CHG cloths as told by your health care provider, and follow the instructions on the label.  Use the CHG cloth on clean, dry skin.  Do not use the CHG cloth on your head or face unless your health care provider tells you to.  When washing with the CHG cloth:  Avoid your genital area.  Avoid any areas of skin that have broken skin, cuts, or scrapes.  Before surgery    Follow these steps when using a CHG cloth to clean before surgery (unless your health care provider gives you different instructions):  Using the CHG cloth, vigorously scrub the part of your body where you will be having surgery. Scrub using a back-and-forth motion for 3 minutes. The area on your body should be completely wet with CHG when you are done scrubbing.  Do not rinse. Discard the cloth and let the area air-dry. Do  not put any substances on the area afterward, such as powder, lotion, or perfume.  Put on clean clothes or pajamas.  If it is the night before your surgery, sleep in clean sheets.     For general bathing  Follow these steps when using CHG cloths for general bathing (unless your health care provider gives you different instructions).  Use a separate CHG cloth for each area of your body. Make sure you wash between any folds of skin and between your fingers and toes. Wash your body in the following order, switching to a new cloth after each step:  The front of your neck, shoulders, and chest.  Both of your arms, under your arms, and your hands.  Your stomach and groin area, avoiding the genitals.  Your right leg and foot.  Your left leg and foot.  The back of your neck, your back, and your buttocks.  Do not rinse. Discard the cloth and let the area air-dry. Do not put any substances on your body afterward--such as powder, lotion, or perfume--unless you are told to do so by your health care provider. Only use lotions that are recommended by the .  Put on clean clothes or pajamas.  Contact a health care provider if:  Your skin gets irritated after scrubbing.  You have questions about using your solution or cloth.  You swallow any chlorhexidine. Call your local poison control center (1-142.604.5795 in the [place]  Get help right away if:  Your eyes itch badly, or they become very red or swollen.  Your skin itches badly and is red or swollen.  Your hearing changes.  You have trouble seeing.  You have swelling or tingling in your mouth or throat.  You have trouble breathing.  These symptoms may represent a serious problem that is an emergency. Do not wait to see if the symptoms will go away. Get medical help right away. Call your local emergency services (682 in the [place] Do not drive yourself to the hospital.  Summary  Chlorhexidine gluconate (CHG) is a germ-killing (antiseptic) solution that is used to clean  the skin. Cleaning your skin with CHG may help to lower your risk for infection.  You may be given CHG to use for bathing. It may be in a bottle or in a prepackaged cloth to use on your skin. Carefully follow your health care provider's instructions and the instructions on the product label.  Do not use CHG if you have a chlorhexidine allergy.  Contact your health care provider if your skin gets irritated after scrubbing.  This information is not intended to replace advice given to you by your health care provider. Make sure you discuss any questions you have with your health care provider.  Document Revised: 04/17/2023 Document Reviewed: 02/28/2022  [employer] Patient Education © 2023 [employer]

## 2023-12-27 ENCOUNTER — ANESTHESIA EVENT (OUTPATIENT)
Dept: PERIOP | Facility: HOSPITAL | Age: 43
End: 2023-12-27
Payer: COMMERCIAL

## 2023-12-27 ENCOUNTER — HOSPITAL ENCOUNTER (OUTPATIENT)
Facility: HOSPITAL | Age: 43
Setting detail: HOSPITAL OUTPATIENT SURGERY
Discharge: HOME | End: 2023-12-27
Attending: SURGERY | Admitting: SURGERY
Payer: COMMERCIAL

## 2023-12-27 ENCOUNTER — ANESTHESIA (OUTPATIENT)
Dept: PERIOP | Facility: HOSPITAL | Age: 43
End: 2023-12-27
Payer: COMMERCIAL

## 2023-12-27 VITALS
HEART RATE: 88 BPM | SYSTOLIC BLOOD PRESSURE: 120 MMHG | RESPIRATION RATE: 20 BRPM | DIASTOLIC BLOOD PRESSURE: 71 MMHG | TEMPERATURE: 98.4 F | OXYGEN SATURATION: 97 %

## 2023-12-27 DIAGNOSIS — Z90.13 ACQUIRED ABSENCE OF BREAST AND ABSENT NIPPLE, BILATERAL: Primary | ICD-10-CM

## 2023-12-27 DIAGNOSIS — N64.89 SEROMA OF BREAST: ICD-10-CM

## 2023-12-27 LAB — B-HCG UR QL: NEGATIVE

## 2023-12-27 PROCEDURE — 25010000002 MIDAZOLAM PER 1 MG: Performed by: ANESTHESIOLOGY

## 2023-12-27 PROCEDURE — 25010000002 PROPOFOL 1000 MG/100ML EMULSION: Performed by: NURSE ANESTHETIST, CERTIFIED REGISTERED

## 2023-12-27 PROCEDURE — 25010000002 FENTANYL CITRATE (PF) 50 MCG/ML SOLUTION: Performed by: NURSE ANESTHETIST, CERTIFIED REGISTERED

## 2023-12-27 PROCEDURE — 88304 TISSUE EXAM BY PATHOLOGIST: CPT | Performed by: SURGERY

## 2023-12-27 PROCEDURE — 81025 URINE PREGNANCY TEST: CPT | Performed by: SURGERY

## 2023-12-27 PROCEDURE — 25010000002 DROPERIDOL PER 5 MG: Performed by: ANESTHESIOLOGY

## 2023-12-27 PROCEDURE — 25010000002 FENTANYL CITRATE (PF) 50 MCG/ML SOLUTION: Performed by: ANESTHESIOLOGY

## 2023-12-27 PROCEDURE — 25010000002 CEFAZOLIN PER 500 MG: Performed by: SURGERY

## 2023-12-27 PROCEDURE — 25010000002 BUPIVACAINE 0.25 % SOLUTION: Performed by: SURGERY

## 2023-12-27 PROCEDURE — 25810000003 LACTATED RINGERS PER 1000 ML: Performed by: ANESTHESIOLOGY

## 2023-12-27 PROCEDURE — 25810000003 LACTATED RINGERS PER 1000 ML: Performed by: SURGERY

## 2023-12-27 RX ORDER — HYDROCODONE BITARTRATE AND ACETAMINOPHEN 5; 325 MG/1; MG/1
1 TABLET ORAL ONCE AS NEEDED
Status: DISCONTINUED | OUTPATIENT
Start: 2023-12-27 | End: 2023-12-27 | Stop reason: HOSPADM

## 2023-12-27 RX ORDER — SODIUM CHLORIDE 0.9 % (FLUSH) 0.9 %
SYRINGE (ML) INJECTION AS NEEDED
Status: DISCONTINUED | OUTPATIENT
Start: 2023-12-27 | End: 2023-12-27 | Stop reason: HOSPADM

## 2023-12-27 RX ORDER — BUPIVACAINE HYDROCHLORIDE 2.5 MG/ML
INJECTION, SOLUTION INFILTRATION; PERINEURAL AS NEEDED
Status: DISCONTINUED | OUTPATIENT
Start: 2023-12-27 | End: 2023-12-27 | Stop reason: HOSPADM

## 2023-12-27 RX ORDER — FENTANYL CITRATE 50 UG/ML
INJECTION, SOLUTION INTRAMUSCULAR; INTRAVENOUS AS NEEDED
Status: DISCONTINUED | OUTPATIENT
Start: 2023-12-27 | End: 2023-12-27 | Stop reason: SURG

## 2023-12-27 RX ORDER — LIDOCAINE HYDROCHLORIDE 10 MG/ML
0.5 INJECTION, SOLUTION EPIDURAL; INFILTRATION; INTRACAUDAL; PERINEURAL ONCE AS NEEDED
Status: DISCONTINUED | OUTPATIENT
Start: 2023-12-27 | End: 2023-12-27 | Stop reason: HOSPADM

## 2023-12-27 RX ORDER — SODIUM CHLORIDE 0.9 % (FLUSH) 0.9 %
3 SYRINGE (ML) INJECTION AS NEEDED
Status: DISCONTINUED | OUTPATIENT
Start: 2023-12-27 | End: 2023-12-27 | Stop reason: HOSPADM

## 2023-12-27 RX ORDER — HYDROCODONE BITARTRATE AND ACETAMINOPHEN 10; 325 MG/1; MG/1
1 TABLET ORAL EVERY 4 HOURS PRN
Status: DISCONTINUED | OUTPATIENT
Start: 2023-12-27 | End: 2023-12-27 | Stop reason: HOSPADM

## 2023-12-27 RX ORDER — PROPOFOL 10 MG/ML
INJECTION, EMULSION INTRAVENOUS CONTINUOUS PRN
Status: DISCONTINUED | OUTPATIENT
Start: 2023-12-27 | End: 2023-12-27 | Stop reason: SURG

## 2023-12-27 RX ORDER — ONDANSETRON 4 MG/1
4 TABLET, FILM COATED ORAL EVERY 8 HOURS PRN
Qty: 6 TABLET | Refills: 0 | Status: SHIPPED | OUTPATIENT
Start: 2023-12-27

## 2023-12-27 RX ORDER — DROPERIDOL 2.5 MG/ML
0.62 INJECTION, SOLUTION INTRAMUSCULAR; INTRAVENOUS ONCE AS NEEDED
Status: COMPLETED | OUTPATIENT
Start: 2023-12-27 | End: 2023-12-27

## 2023-12-27 RX ORDER — SODIUM CHLORIDE 0.9 % (FLUSH) 0.9 %
3-10 SYRINGE (ML) INJECTION AS NEEDED
Status: DISCONTINUED | OUTPATIENT
Start: 2023-12-27 | End: 2023-12-27 | Stop reason: HOSPADM

## 2023-12-27 RX ORDER — SODIUM CHLORIDE, SODIUM LACTATE, POTASSIUM CHLORIDE, CALCIUM CHLORIDE 600; 310; 30; 20 MG/100ML; MG/100ML; MG/100ML; MG/100ML
100 INJECTION, SOLUTION INTRAVENOUS CONTINUOUS
Status: DISCONTINUED | OUTPATIENT
Start: 2023-12-27 | End: 2023-12-27 | Stop reason: HOSPADM

## 2023-12-27 RX ORDER — SODIUM CHLORIDE, SODIUM LACTATE, POTASSIUM CHLORIDE, CALCIUM CHLORIDE 600; 310; 30; 20 MG/100ML; MG/100ML; MG/100ML; MG/100ML
1000 INJECTION, SOLUTION INTRAVENOUS CONTINUOUS
Status: DISCONTINUED | OUTPATIENT
Start: 2023-12-27 | End: 2023-12-27 | Stop reason: HOSPADM

## 2023-12-27 RX ORDER — FLUMAZENIL 0.1 MG/ML
0.2 INJECTION INTRAVENOUS AS NEEDED
Status: DISCONTINUED | OUTPATIENT
Start: 2023-12-27 | End: 2023-12-27 | Stop reason: HOSPADM

## 2023-12-27 RX ORDER — IBUPROFEN 600 MG/1
600 TABLET ORAL ONCE AS NEEDED
Status: DISCONTINUED | OUTPATIENT
Start: 2023-12-27 | End: 2023-12-27 | Stop reason: HOSPADM

## 2023-12-27 RX ORDER — SCOLOPAMINE TRANSDERMAL SYSTEM 1 MG/1
1 PATCH, EXTENDED RELEASE TRANSDERMAL ONCE
Status: DISCONTINUED | OUTPATIENT
Start: 2023-12-27 | End: 2023-12-27 | Stop reason: HOSPADM

## 2023-12-27 RX ORDER — LIDOCAINE HYDROCHLORIDE AND EPINEPHRINE 10; 10 MG/ML; UG/ML
INJECTION, SOLUTION INFILTRATION; PERINEURAL AS NEEDED
Status: DISCONTINUED | OUTPATIENT
Start: 2023-12-27 | End: 2023-12-27 | Stop reason: HOSPADM

## 2023-12-27 RX ORDER — ACETAMINOPHEN 500 MG
1000 TABLET ORAL ONCE
Status: COMPLETED | OUTPATIENT
Start: 2023-12-27 | End: 2023-12-27

## 2023-12-27 RX ORDER — SODIUM CHLORIDE 0.9 % (FLUSH) 0.9 %
3 SYRINGE (ML) INJECTION EVERY 12 HOURS SCHEDULED
Status: DISCONTINUED | OUTPATIENT
Start: 2023-12-27 | End: 2023-12-27 | Stop reason: HOSPADM

## 2023-12-27 RX ORDER — MIDAZOLAM HYDROCHLORIDE 1 MG/ML
1 INJECTION INTRAMUSCULAR; INTRAVENOUS
Status: COMPLETED | OUTPATIENT
Start: 2023-12-27 | End: 2023-12-27

## 2023-12-27 RX ORDER — ONDANSETRON 2 MG/ML
4 INJECTION INTRAMUSCULAR; INTRAVENOUS ONCE AS NEEDED
Status: DISCONTINUED | OUTPATIENT
Start: 2023-12-27 | End: 2023-12-27 | Stop reason: HOSPADM

## 2023-12-27 RX ORDER — LABETALOL HYDROCHLORIDE 5 MG/ML
5 INJECTION, SOLUTION INTRAVENOUS
Status: DISCONTINUED | OUTPATIENT
Start: 2023-12-27 | End: 2023-12-27 | Stop reason: HOSPADM

## 2023-12-27 RX ORDER — MAGNESIUM HYDROXIDE 1200 MG/15ML
LIQUID ORAL AS NEEDED
Status: DISCONTINUED | OUTPATIENT
Start: 2023-12-27 | End: 2023-12-27 | Stop reason: HOSPADM

## 2023-12-27 RX ORDER — FENTANYL CITRATE 50 UG/ML
25 INJECTION, SOLUTION INTRAMUSCULAR; INTRAVENOUS
Status: DISCONTINUED | OUTPATIENT
Start: 2023-12-27 | End: 2023-12-27 | Stop reason: HOSPADM

## 2023-12-27 RX ORDER — TRAMADOL HYDROCHLORIDE 50 MG/1
50 TABLET ORAL EVERY 6 HOURS PRN
Qty: 8 TABLET | Refills: 0 | Status: SHIPPED | OUTPATIENT
Start: 2023-12-27 | End: 2023-12-29

## 2023-12-27 RX ORDER — SODIUM CHLORIDE 9 MG/ML
40 INJECTION, SOLUTION INTRAVENOUS AS NEEDED
Status: DISCONTINUED | OUTPATIENT
Start: 2023-12-27 | End: 2023-12-27 | Stop reason: HOSPADM

## 2023-12-27 RX ORDER — NALOXONE HCL 0.4 MG/ML
0.4 VIAL (ML) INJECTION AS NEEDED
Status: DISCONTINUED | OUTPATIENT
Start: 2023-12-27 | End: 2023-12-27 | Stop reason: HOSPADM

## 2023-12-27 RX ADMIN — SODIUM CHLORIDE, POTASSIUM CHLORIDE, SODIUM LACTATE AND CALCIUM CHLORIDE 1000 ML: 600; 310; 30; 20 INJECTION, SOLUTION INTRAVENOUS at 09:08

## 2023-12-27 RX ADMIN — SODIUM CHLORIDE, POTASSIUM CHLORIDE, SODIUM LACTATE AND CALCIUM CHLORIDE 100 ML/HR: 600; 310; 30; 20 INJECTION, SOLUTION INTRAVENOUS at 13:51

## 2023-12-27 RX ADMIN — PROPOFOL 150 MCG/KG/MIN: 10 INJECTION, EMULSION INTRAVENOUS at 11:46

## 2023-12-27 RX ADMIN — ACETAMINOPHEN TAB 500 MG 1000 MG: 500 TAB at 11:03

## 2023-12-27 RX ADMIN — MIDAZOLAM HYDROCHLORIDE 1 MG: 1 INJECTION, SOLUTION INTRAMUSCULAR; INTRAVENOUS at 11:25

## 2023-12-27 RX ADMIN — PROPOFOL 100 MG: 10 INJECTION, EMULSION INTRAVENOUS at 11:47

## 2023-12-27 RX ADMIN — DROPERIDOL 0.62 MG: 2.5 INJECTION, SOLUTION INTRAMUSCULAR; INTRAVENOUS at 13:49

## 2023-12-27 RX ADMIN — FENTANYL CITRATE 50 MCG: 50 INJECTION, SOLUTION INTRAMUSCULAR; INTRAVENOUS at 12:01

## 2023-12-27 RX ADMIN — FENTANYL CITRATE 50 MCG: 50 INJECTION, SOLUTION INTRAMUSCULAR; INTRAVENOUS at 12:02

## 2023-12-27 RX ADMIN — MIDAZOLAM HYDROCHLORIDE 1 MG: 1 INJECTION, SOLUTION INTRAMUSCULAR; INTRAVENOUS at 11:37

## 2023-12-27 RX ADMIN — FENTANYL CITRATE 100 MCG: 50 INJECTION, SOLUTION INTRAMUSCULAR; INTRAVENOUS at 11:46

## 2023-12-27 RX ADMIN — SCOPALAMINE 1 PATCH: 1 PATCH, EXTENDED RELEASE TRANSDERMAL at 11:03

## 2023-12-27 RX ADMIN — FENTANYL CITRATE 25 MCG: 50 INJECTION, SOLUTION INTRAMUSCULAR; INTRAVENOUS at 13:54

## 2023-12-27 RX ADMIN — CEFAZOLIN 2000 MG: 2 INJECTION, POWDER, FOR SOLUTION INTRAMUSCULAR; INTRAVENOUS at 11:51

## 2023-12-27 NOTE — OP NOTE
BREAST CAPSULOTOMY CAPSULECTOMY  Procedure Report    Patient Name:  Rachel Manley  YOB: 1980    Date of Surgery:  12/27/2023    Attending Surgeon: Drew Gonzalez MD     PROCEDURE:  1.  Excision of chronic encapsulated right breast seroma, 12 cm x 6 cm x 4 cm  2.  Intermediate closure, right breast, 20 cm    PREOPERATIVE DIAGNOSIS:  Chronic encapsulated seroma, right breast    POSTOPERATIVE DIAGNOSIS:  Same    ANESTHESIA:  MAC with local    INDICATION FOR PROCEDURE:  This is a 43-year-old female with a chronic encapsulated seroma of her right breast.  We will proceed with excision and drain placement as planned.    OPERATIVE FINDINGS:  The encapsulated seroma was encountered in the prepectoral space.  It was meticulously dissected free of soft tissue attachments circumferentially and removed en bloc without rupturing contents.  The seroma was drained on the back table, and the capsule was sent to pathology.    Two 15 Syrian CLAUDIA drains were placed in the right breast exiting inferolaterally.  They were secured at the skin with 3-0 nylon.    DESCRIPTION OF PROCEDURE:  The right breast was incised along the existing scars.  The capsule of the seroma was encountered in the prepectoral plane.  With a combination of sharp dissection and electrocautery, the capsule was freed of all surrounding soft tissue attachments circumferentially.  The breast pocket was then irrigated with doxycycline and thoroughly scrubbed with a sterile scrub brush.  Two 15 Syrian CLAUDIA drains were placed and secured as described above.  A layered closure was then performed with interrupted deep dermal 4-0 Vicryl and a running dermal 4-0 Vicryl.  The incisions were dressed with Steri-Strips, and a postoperative breast wrap was applied.    BLOOD LOSS:   20 mL    COMPLICATIONS:   None    SPECIMENS:          1.  Right breast seroma capsule    DISPOSITION:   Home and self-care.  Return to clinic tomorrow.    STAFF:  Surgeon(s):  Carlos  Drew CANCHOLA MD           Electronically signed by Drew Gonzalez MD, 12/27/23, 1:21 PM CST.

## 2023-12-27 NOTE — DISCHARGE INSTRUCTIONS
Breast Surgery Postoperative Instructions  Drew Gonzalez MD    DRESSING CARE:  Please keep your breast wrap in place until you return to clinic.    It is important for this dressing to remain clean and dry.      ACTIVITY:  Please spend at least 5 to 10 minutes on your feet every hour from the time you discharge until the time you normally go to bed.  You do not have to wake up through the night to perform this activity.  Please do not lift more than 10 pounds.    Do not stretch behind your back.   Do not reach over your head.      CLINIC FOLLOW UP:  You will have scheduled follow-up in my clinic tomorrow.    Please be sure that this appointment time has been confirmed before you leave the hospital. If you have not been given an appointment time, please call my office to schedule an appointment.    MEDICATIONS:  Please take your prescription pain medication only as needed.    It is ok to take over-the-counter pain medications as well per package instructions.    Please wean off of your prescription pain medication as soon as possible.  If you have been prescribed antibiotics, please take them as instructed.    DRAIN CARE:  If you have drains in place, please follow the instructions provided by your nurse for drain care.  Please keep a very accurate record of your drain output for each individual drain.    Questions or Concerns    If you have additional questions or concerns, please contact [facility] at [phone] during or after office hours. After hours, you will have the option to press #1 to speak to the answering service.  They will be able to put you in touch with Dr. Gonzalez if necessary.     In the case of an emergency, please call [phone].    Signs and Symptoms of Infection and/or Complication:  Rapid or asymmetric swelling  Swelling that worsens after the first 48 hours  Redness and warmth developing on previously normal-appearing skin  Drainage other than scant blood or  blood-tinged clear fluid  Fever or chills  Nausea and vomiting or diarrhea   Separation of the incision  Bleeding that does not stop with pressure    Signs and Symptoms of a Potential Emergency:    If you experience any of the following during your postoperative recovery, this may represent an emergency.  Please call 911 and seek immediate medical attention:    Loss of consciousness or “blacking out”  Worsening shortness of breath or inability to catch your breath  No onset chest, shoulder, or neck pain  Leg pain or swelling  Light-headedness or dizziness when attempting to stand or walk

## 2023-12-27 NOTE — ANESTHESIA PREPROCEDURE EVALUATION
Anesthesia Evaluation     Patient summary reviewed   history of anesthetic complications:  PONV  NPO Solid Status: > 6 hours             Airway   Mallampati: I  TM distance: >3 FB  Neck ROM: full  No difficulty expected  Dental - normal exam     Pulmonary    (-) sleep apnea, no home oxygen  Cardiovascular - negative cardio ROS        Neuro/Psych  (-) seizures, CVA  GI/Hepatic/Renal/Endo    (+) GERD  (-) diabetes    Musculoskeletal     Abdominal    Substance History      OB/GYN          Other                        Anesthesia Plan    ASA 2     MAC     (Avoid ketamine per patient. Dexmed seems ok on my chart review )  intravenous induction     Anesthetic plan, risks, benefits, and alternatives have been provided, discussed and informed consent has been obtained with: patient.      CODE STATUS:

## 2023-12-27 NOTE — ANESTHESIA POSTPROCEDURE EVALUATION
Patient: Rachel Manley    Procedure Summary       Date: 12/27/23 Room / Location:  [facility]  /  [facility]    Anesthesia Start: 1144 Anesthesia Stop: 1324    Procedure: EXCISION OF ENCAPSULATED RIGHT BREAST SEROMA (Right: Breast) Diagnosis:     Surgeons: Drew Gonzalez MD Provider: Mart Pereira CRNA    Anesthesia Type: MAC ASA Status: 2            Anesthesia Type: MAC    Vitals  Vitals Value Taken Time   /64 12/27/23 1425   Temp 98.4 °F (36.9 °C) 12/27/23 1425   Pulse 81 12/27/23 1428   Resp 16 12/27/23 1425   SpO2 95 % 12/27/23 1428   Vitals shown include unfiled device data.        Post Anesthesia Care and Evaluation    Patient location during evaluation: PACU  Patient participation: complete - patient participated  Level of consciousness: awake and alert  Pain management: adequate    Airway patency: patent  Anesthetic complications: No anesthetic complications    Cardiovascular status: acceptable  Respiratory status: acceptable  Hydration status: acceptable    Comments: Blood pressure 114/69, pulse 65, temperature 98.4 °F (36.9 °C), temperature source Temporal, resp. rate 20, SpO2 95%, not currently breastfeeding.    Pt discharged from PACU based on jyoti score >8

## 2024-01-04 NOTE — H&P
[facility]   Admission Note    Patient Name: Rachel Manley  : 1980  MRN: [id number]  Primary Care Physician:  Becca Anderson APRN  Referring Physician: Drew Gonzalez MD  Date of admission: 2023    Consults  Subjective   Subjective     Reason for Consult/ Chief Complaint: Right breast encapsulated seroma     History of Present Illness  Rachel Manley is a 43 y.o. 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  She has now developed encapsulation of the seroma, and she presents today to undergo excision.    Review of Systems   Negative except as mentioned above    Personal History     Past Medical History:   Diagnosis Date    Arthritis     Family history of breast cancer     GERD (gastroesophageal reflux disease)     Migraines     Personal history of COVID-19     PONV (postoperative nausea and vomiting)     Skin flap necrosis 2023       Past Surgical History:   Procedure Laterality Date    APPENDECTOMY      BILATERAL BREAST REDUCTION      BREAST BIOPSY Bilateral     x2    BREAST CAPSULOTOMY/CAPSULECTOMY Right 2023    Procedure: EXCISION OF ENCAPSULATED RIGHT BREAST SEROMA;  Surgeon: Drew Gonzalez MD;  Location:  [facility];  Service: Plastics;  Laterality: Right;    BREAST RECONSTRUCTION, BREAST TISSUE EXPANDER INSERTION Bilateral 2023    Procedure: BILATERAL FIRST STAGE BREAST RECONSTRUCTION WITH TISSUE EXPANDER PLACEMENT VS DIRECT TO IMPLANT BILATERAL BREAST RECONSTRUCTION;  Surgeon: Drew Gonzalez MD;  Location:  [facility];  Service: Plastics;  Laterality: Bilateral;    BREAST TISSUE EXPANDER REMOVAL INSERTION OF IMPLANT Bilateral 2023    Procedure: REMOVAL OF BILATERAL TISSUE EXPANDERS;  Surgeon: Drew Gonzalez MD;  Location:  [facility];  Service: Plastics;  Laterality: Bilateral;     SECTION       SECTION      CHOLECYSTECTOMY      COLONOSCOPY      LASIK       MASTECTOMY Bilateral 7/5/2023    Procedure: Bilateral nipple sparing mastectomies - Co-case with Dr. Gonzalez;  Surgeon: Delma Pisano MD;  Location: [facility];  Service: General;  Laterality: Bilateral;    REDUCTION MAMMAPLASTY         Family History: Her family history includes Breast cancer in her paternal aunt.     Social History: She  reports that she has never smoked. She has never used smokeless tobacco. She reports that she does not drink alcohol and does not use drugs.    Home Medications:  Levonorgestrel, Vitamin D3, busPIRone, cetirizine, cimetidine, ibuprofen, and ondansetron    Allergies:  She is allergic to sulfa antibiotics.    Objective    Objective     Vitals:         Output by Drain (mL) 01/03/24 0701 - 01/03/24 1900 01/03/24 1901 - 01/04/24 0700 01/04/24 0701 - 01/04/24 1516 Range Total   Closed/Suction Drain 1 Lateral;Right Breast Bulb 15 Fr.       Closed/Suction Drain 2 Inferior;Lateral;Right Breast Bulb 15 Fr.           Physical Exam:   Firm mass immediately superficial to the right pectoralis and deep to the mastectomy closure.  Unchanged since prior exam.           There were no vitals filed for this visit.    Result Review      Result Review:  I have personally reviewed the results from the time of this admission to 1/4/2024 15:16 CST and agree with these findings:  [x]  Laboratory  []  Microbiology  []  Radiology  []  EKG/Telemetry   []  Cardiology/Vascular   []  Pathology  []  Old records  []  Other:    Most notable findings include: HGB 12.9    Assessment & Plan     Assessment / Plan     Brief Patient Summary:  Rachel Manley is a 43 y.o. 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  She has now developed encapsulation of the seroma, and she presents today to undergo excision. We will proceed as planned.  She will follow up in my clinic tomorrow.    Active Hospital Problems:  There are no active  hospital problems to display for this patient.      DVT prophylaxis:  No DVT prophylaxis order currently exists.      Electronically signed by Drew Gonzalez MD, 01/04/24, 3:16 PM CST.

## (undated) DEVICE — STRIP CLS WND CURAD MEDI/STRIP HYPOALLERG 0.25X4IN PK/10

## (undated) DEVICE — RESERVOIR,SUCTION,100CC,SILICONE: Brand: MEDLINE

## (undated) DEVICE — SYR CONTRL PRESS/LO FIX/M/LL W/THMB/RNG 10ML

## (undated) DEVICE — BNDG ELAS CO-FLEX SLF ADHR 6IN 5YD LF STRL

## (undated) DEVICE — TOWL OR PREWSH 36X36IN XL BLU DISP STRL

## (undated) DEVICE — SUT MNCRYL 4/0 PS2 27IN UD MCP426H

## (undated) DEVICE — DRAPE,ORTHOMAX ,BAR: Brand: MEDLINE

## (undated) DEVICE — GLV SURG BIOGEL LTX PF 6 1/2

## (undated) DEVICE — RETRACTOR 135X30MM WITH BUILT-IN SINGLE-USE MULTI-LED LIGHT SOURCE - STERILE: Brand: ONETRAC LX

## (undated) DEVICE — APPL CHLORAPREP HI/LITE 26ML ORNG

## (undated) DEVICE — TRAP FLD MINIVAC MEGADYNE 100ML

## (undated) DEVICE — PREP SOL POVIDONE/IODINE BT 4OZ

## (undated) DEVICE — PAD MAJOR: Brand: MEDLINE INDUSTRIES, INC.

## (undated) DEVICE — Device

## (undated) DEVICE — PREP SPRY PVPI 10P 2OZ

## (undated) DEVICE — NEEDLE, QUINCKE 22GX3.5": Brand: MEDLINE INDUSTRIES, INC.

## (undated) DEVICE — ADHS LIQ MASTISOL 2/3ML

## (undated) DEVICE — GLV SURG BIOGEL LTX PF 8

## (undated) DEVICE — ANTIBACTERIAL UNDYED BRAIDED (POLYGLACTIN 910), SYNTHETIC ABSORBABLE SUTURE: Brand: COATED VICRYL

## (undated) DEVICE — BANDAGE,GAUZE,BULKEE II,4.5"X4.1YD,STRL: Brand: MEDLINE

## (undated) DEVICE — GOWN,NON-REINFORCED,SIRUS,SET IN SLV,XXL: Brand: MEDLINE

## (undated) DEVICE — PROXIMATE RH ROTATING HEAD SKIN STAPLERS (35 REGULAR) CONTAINS 35 STAINLESS STEEL STAPLES: Brand: PROXIMATE